# Patient Record
Sex: MALE | Race: WHITE | Employment: UNEMPLOYED | URBAN - METROPOLITAN AREA
[De-identification: names, ages, dates, MRNs, and addresses within clinical notes are randomized per-mention and may not be internally consistent; named-entity substitution may affect disease eponyms.]

---
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
## 2022-02-05 ENCOUNTER — APPOINTMENT (OUTPATIENT)
Dept: GENERAL RADIOLOGY | Age: 22
End: 2022-02-05
Payer: COMMERCIAL

## 2022-02-05 ENCOUNTER — HOSPITAL ENCOUNTER (EMERGENCY)
Age: 22
Discharge: HOME | End: 2022-02-05
Payer: COMMERCIAL

## 2022-02-05 VITALS
WEIGHT: 255 LBS | HEART RATE: 84 BPM | HEIGHT: 70 IN | RESPIRATION RATE: 16 BRPM | DIASTOLIC BLOOD PRESSURE: 79 MMHG | OXYGEN SATURATION: 100 % | SYSTOLIC BLOOD PRESSURE: 136 MMHG | TEMPERATURE: 98 F | BODY MASS INDEX: 36.51 KG/M2

## 2022-02-05 DIAGNOSIS — S46.911A SHOULDER STRAIN, RIGHT, INITIAL ENCOUNTER: Primary | ICD-10-CM

## 2022-02-05 PROCEDURE — 6370000000 HC RX 637 (ALT 250 FOR IP): Performed by: NURSE PRACTITIONER

## 2022-02-05 PROCEDURE — 6360000002 HC RX W HCPCS: Performed by: NURSE PRACTITIONER

## 2022-02-05 PROCEDURE — 73030 X-RAY EXAM OF SHOULDER: CPT

## 2022-02-05 PROCEDURE — 99283 EMERGENCY DEPT VISIT LOW MDM: CPT

## 2022-02-05 PROCEDURE — 96372 THER/PROPH/DIAG INJ SC/IM: CPT

## 2022-02-05 RX ORDER — LIDOCAINE 4 G/G
1 PATCH TOPICAL ONCE
Status: DISCONTINUED | OUTPATIENT
Start: 2022-02-05 | End: 2022-02-05 | Stop reason: HOSPADM

## 2022-02-05 RX ORDER — ORPHENADRINE CITRATE 30 MG/ML
60 INJECTION INTRAMUSCULAR; INTRAVENOUS ONCE
Status: COMPLETED | OUTPATIENT
Start: 2022-02-05 | End: 2022-02-05

## 2022-02-05 RX ORDER — KETOROLAC TROMETHAMINE 30 MG/ML
30 INJECTION, SOLUTION INTRAMUSCULAR; INTRAVENOUS ONCE
Status: COMPLETED | OUTPATIENT
Start: 2022-02-05 | End: 2022-02-05

## 2022-02-05 RX ORDER — CYCLOBENZAPRINE HCL 10 MG
10 TABLET ORAL 3 TIMES DAILY PRN
Qty: 21 TABLET | Refills: 0 | Status: SHIPPED | OUTPATIENT
Start: 2022-02-05

## 2022-02-05 RX ORDER — LIDOCAINE 4 G/G
1 PATCH TOPICAL DAILY
Qty: 30 PATCH | Refills: 0 | Status: SHIPPED | OUTPATIENT
Start: 2022-02-05 | End: 2022-03-07

## 2022-02-05 RX ADMIN — KETOROLAC TROMETHAMINE 30 MG: 30 INJECTION, SOLUTION INTRAMUSCULAR at 13:00

## 2022-02-05 RX ADMIN — ORPHENADRINE CITRATE 60 MG: 30 INJECTION INTRAMUSCULAR; INTRAVENOUS at 13:00

## 2022-02-05 ASSESSMENT — PAIN SCALES - GENERAL
PAINLEVEL_OUTOF10: 10
PAINLEVEL_OUTOF10: 4
PAINLEVEL_OUTOF10: 6

## 2022-02-05 NOTE — ED NOTES
D/C: Order noted for d/c. Pt confirmed d/c paperwork  have correct name. Pt was given 3 scripts and discussed medications with pt. Discharge and education instructions reviewed with patient. Teach-back successful. Pt verbalized understanding and signed d/c papers. Pt denied questions at this time. No acute distress noted. Patient instructed to follow-up as noted - return to emergency department if symptoms worsen. Patient verbalized understanding. Discharged per EDMD with discharge instructions. Pt discharged to private vehicle. Patient stable upon departure. Thanked patient for choosing [facility] for care. Provider aware of patient pain at time of discharge.      Fili Sellers, DREA  02/05/22 3565

## 2022-02-05 NOTE — ED PROVIDER NOTES
[address] [facility]        Pt Name: Clarita Jacques  MRN: [id number]  [place] 2000  Date of evaluation: 2/5/2022  Provider: JOSE Kirby - KRISH  PCP: Dede Perez MD  Note Started: 1:01 PM EST       CHELLY. I have evaluated this patient. My supervising physician was available for consultation. CHIEF COMPLAINT       No chief complaint on file. HISTORY OF PRESENT ILLNESS   (Location, Timing/Onset, Context/Setting, Quality, Duration, Modifying Factors, Severity, Associated Signs and Symptoms)  Note limiting factors. Chief Complaint: Right posterior shoulder pain    Clarita Jacques is a 24 y.o. male with medical history of ADHD, kidney stone, OCD presents emergency department acute onset of pain into his right posterior shoulder. Patient states approximately 10 minutes prior to arrival he was bending over and attempt to remove his shoe whenever he felt a pulling sensation to his right posterior shoulder. It starts in his right scapula and radiates into his right shoulder and right upper extremity. Pain is worse with any type of movement to include deep breathing. He was concerned and rest to the emergency department without any treatment prior to arrival.  He states the injury was no impact as he was simply bending over to remove his shoe. He thinks he might have twisted his upper body slightly as he was removing the shoe. He denies any trauma or falls. He denies any associated neck pain, back pain, numbness, tingling, weakness, short of air, cough, redness, swelling, or deformity. Nursing Notes were all reviewed and agreed with or any disagreements were addressed in the HPI. REVIEW OF SYSTEMS    (2-9 systems for level 4, 10 or more for level 5)     Review of Systems    Positives and Pertinent negatives as per HPI. Except as noted above in the ROS, all other systems were reviewed and negative.        PAST MEDICAL HISTORY     Past Medical History:   Diagnosis Date    ADHD (attention deficit hyperactivity disorder)     Kidney stone 08/03/2019    OCD (obsessive compulsive disorder) 10/24/2013         SURGICAL HISTORY     Past Surgical History:   Procedure Laterality Date    SHOULDER SURGERY           CURRENTMEDICATIONS       Discharge Medication List as of 2/5/2022  2:24 PM      CONTINUE these medications which have NOT CHANGED    Details   ibuprofen (ADVIL;MOTRIN) 800 MG tablet Take 1 tablet by mouth every 8 hours as needed for Pain, Disp-30 tablet, R-0Print               ALLERGIES     Patient has no known allergies. FAMILYHISTORY       Family History   Problem Relation Age of Onset    Diabetes Father     High Blood Pressure Father     High Cholesterol Father     Kidney Disease Father     Diabetes Maternal Grandmother     High Blood Pressure Maternal Grandmother           SOCIAL HISTORY       Social History     Tobacco Use    Smoking status: Never Smoker    Smokeless tobacco: Never Used   Vaping Use    Vaping Use: Not on file   Substance Use Topics    Alcohol use: No    Drug use: Yes     Types: Marijuana (Weed)       SCREENINGS             PHYSICAL EXAM    (up to 7 for level 4, 8 or more for level 5)     ED Triage Vitals   Enc Vitals Group      BP 02/05/22 1415 125/80      Pulse 02/05/22 1419 84      Resp 02/05/22 1419 16      Temp 02/05/22 1419 98 °F (36.7 °C)      Temp src --       SpO2 02/05/22 1415 100 %      Weight 02/05/22 1419 255 lb (115.7 kg)      Height 02/05/22 1419 5' 10\" (1.778 m)         Physical Exam  Vitals and nursing note reviewed. Constitutional:       General: He is awake. Appearance: Normal appearance. He is well-developed. He is morbidly obese. HENT:      Head: Normocephalic and atraumatic. Nose: Nose normal.   Eyes:      General:         Right eye: No discharge. Left eye: No discharge. Cardiovascular:      Rate and Rhythm: Normal rate and regular rhythm. Pulses:           Radial pulses are 2+ on the right side and 2+ on the left side. Heart sounds: Normal heart sounds. Pulmonary:      Effort: Pulmonary effort is normal. No respiratory distress. Breath sounds: Normal breath sounds. Musculoskeletal:      Right shoulder: Tenderness (scapula) and bony tenderness present. No swelling, deformity, laceration or crepitus. Decreased range of motion (flexion past 90 degrees). Left shoulder: Normal.      Right upper arm: Normal.      Left upper arm: Normal.      Right elbow: Normal.      Left elbow: Normal.      Cervical back: Full passive range of motion without pain and normal range of motion. No spinous process tenderness or muscular tenderness. Skin:     General: Skin is warm and dry. Coloration: Skin is not pale. Neurological:      General: No focal deficit present. Mental Status: He is alert and oriented to person, place, and time. Sensory: Sensation is intact. Gait: Gait is intact. Comments: Hand grasp strength 5/5   Psychiatric:         Behavior: Behavior normal. Behavior is cooperative. DIAGNOSTIC RESULTS   LABS:    Labs Reviewed - No data to display    When ordered only abnormal lab results are displayed. All other labs were within normal range or not returned as of this dictation. EKG: When ordered, EKG's are interpreted by the Emergency Department Physician in the absence of a cardiologist.  Please see their note for interpretation of EKG. RADIOLOGY:   Non-plain film images such as CT, Ultrasound and MRI are read by the radiologist. Plain radiographic images are visualized and preliminarily interpreted by the ED Provider with the below findings:        Interpretation per the Radiologist below, if available at the time of this note:    XR SHOULDER RIGHT (MIN 2 VIEWS)   Final Result   Unremarkable right shoulder. No results found.         PROCEDURES   Unless otherwise noted below, none Procedures    CRITICAL CARE TIME       CONSULTS:  None      EMERGENCY DEPARTMENT COURSE and DIFFERENTIAL DIAGNOSIS/MDM:   Vitals:    Vitals:    02/05/22 1415 02/05/22 1419   BP: 125/80 136/79   Pulse:  84   Resp:  16   Temp:  98 °F (36.7 °C)   SpO2: 100% 100%   Weight:  255 lb (115.7 kg)   Height:  5' 10\" (1.778 m)       Patient was given the following medications:  Medications   ketorolac (TORADOL) injection 30 mg (30 mg IntraMUSCular Given 2/5/22 1300)   orphenadrine (NORFLEX) injection 60 mg (60 mg IntraMUSCular Given 2/5/22 1300)         Care of this patient took place during the COVID-19 pandemic emergency. ED COURSE & MEDICAL DECISION MAKING    - The patient presented to the ER with complaints of right shoulder pain. Vital signs were reviewed. Exam well-developed, well-nourished male who appears uncomfortable. Imaging ordered. - Pertinent Labs & Imaging studies reviewed. (See chart for details)   -  Patient seen and evaluated in the emergency department. -  Triage and nursing notes reviewed and incorporated. -  Old chart records reviewed and incorporated. -  CHELLY. I have evaluated this patient. My supervising physician was available for consultation.  -  Differential diagnosis includes: ACS, MI, costochondritis, pleurisy, aneurysm, dissection, bronchitis, pneumonia, pleural effusion, CHF, cardiomegaly, esophageal rupture, endocarditis, pericarditis, PE, pneumothorax, tamponade versus COVID-19  -  Work-up included:  See above  -  ED treatment included: Toradol, Norflex  -  Results discussed with patient. Siddharth Monroy is a 70-year-old male with acute onset of right posterior shoulder scapular pain that occurred 10 minutes prior to arrival.  Patient states he was taking his shoes off the time when he had felt a pulling sensation into his right posterior shoulder scapular region.   He immediately rushed to the emergency department for further evaluation and did not take any additional medication or treatment prior to arrival.  On exam he does have tenderness to the right posterior scapula that is reproducible with movement of the right shoulder. Lungs clear to auscultate. Radial pulse 2+ bilateral.  CT and L-spine no midline or bony tenderness. Imaging is obtained. X-ray right shoulder shows unremarkable right shoulder. He was informed on these results. He was given strict return discharge instructions. Shared decision making is complete and patient stable for discharge at this time. FINAL IMPRESSION      1.  Shoulder strain, right, initial encounter          DISPOSITION/PLAN   DISPOSITION        PATIENT REFERRED TO:  Carlos Alberto Borrego MD  [address] [place] [address]  [phone]    Call in 2 days  As needed, If symptoms worsen    [facility] Emergency Department  [address] [facility]  [phone]  Go to   As needed      DISCHARGE MEDICATIONS:  Discharge Medication List as of 2/5/2022  2:24 PM      START taking these medications    Details   lidocaine 4 % external patch Place 1 patch onto the skin daily, TransDERmal, DAILY Starting Sat 2/5/2022, Until Mon 3/7/2022, For 30 days, Disp-30 patch, R-0, Print      diclofenac (VOLTAREN) 50 MG EC tablet Take 1 tablet by mouth 2 times daily for 20 doses, Disp-20 tablet, R-0Print      cyclobenzaprine (FLEXERIL) 10 MG tablet Take 1 tablet by mouth 3 times daily as needed for Muscle spasms, Disp-21 tablet, R-0Print             DISCONTINUED MEDICATIONS:  Discharge Medication List as of 2/5/2022  2:24 PM                 (Please note that portions of this note were completed with a voice recognition program.  Efforts were made to edit the dictations but occasionally words are mis-transcribed.)    JOSE Simental CNP (electronically signed)            JOSE Simental CNP  02/05/22 6614